# Patient Record
Sex: FEMALE | Race: WHITE | NOT HISPANIC OR LATINO | URBAN - METROPOLITAN AREA
[De-identification: names, ages, dates, MRNs, and addresses within clinical notes are randomized per-mention and may not be internally consistent; named-entity substitution may affect disease eponyms.]

---

## 2018-09-25 ENCOUNTER — IMPORTED ENCOUNTER (OUTPATIENT)
Dept: URBAN - METROPOLITAN AREA CLINIC 31 | Facility: CLINIC | Age: 78
End: 2018-09-25

## 2018-09-25 PROBLEM — H43.813: Noted: 2018-09-25

## 2018-09-25 PROBLEM — H26.493: Noted: 2018-09-25

## 2018-09-25 PROBLEM — H04.123: Noted: 2018-09-25

## 2018-09-25 PROBLEM — Z96.1: Noted: 2018-09-25

## 2018-09-25 PROCEDURE — 92014 COMPRE OPH EXAM EST PT 1/>: CPT

## 2018-09-25 NOTE — PATIENT DISCUSSION
1.  Dry Eye OU:  Continue current management with Artificial Tears. Discussed possible punctal plugs in future if still problematic - declined today. Allergy to restasis. 2. Pseudophakia OU - IOLs stable. Monitor. 3. PCO OU: (Posterior Capsule Opacification)  Not visually significant at this time. Monitor for yag capsulotomy necessity. 4. PVD OU:  Patient was cautioned to call our office immediately if they experience a substantial change in their symptoms such as an increase in floaters persistent flashes loss of visual field (may appear as a shadow or a curtain) or decrease in visual acuity as these may indicate a retinal tear or detachment. If this is a new problem patient will need to return for re-examination  as determined by the physicianReturn for an appointment in 1 year for comprehensive exam. with Dr. Mimi Wiley.

## 2018-11-05 ENCOUNTER — IMPORTED ENCOUNTER (OUTPATIENT)
Dept: URBAN - METROPOLITAN AREA CLINIC 31 | Facility: CLINIC | Age: 78
End: 2018-11-05

## 2018-11-05 PROBLEM — Z96.1: Noted: 2018-11-05

## 2018-11-05 PROCEDURE — 99213 OFFICE O/P EST LOW 20 MIN: CPT

## 2018-11-05 NOTE — PATIENT DISCUSSION
1.  Corneal Abrasion OD resolved. Stop Tobramycin given from OD at [facility]. Keep scheduled appt. 2. Pseudophakia OU - IOLs stable. Monitor.

## 2020-05-15 ENCOUNTER — IMPORTED ENCOUNTER (OUTPATIENT)
Dept: URBAN - METROPOLITAN AREA CLINIC 31 | Facility: CLINIC | Age: 80
End: 2020-05-15

## 2020-05-15 PROBLEM — Z96.1: Noted: 2020-05-15

## 2020-05-15 PROBLEM — H26.492: Noted: 2020-05-15

## 2020-05-15 PROBLEM — H35.3131: Noted: 2020-05-15

## 2020-05-15 PROBLEM — H16.143: Noted: 2020-05-15

## 2020-05-15 PROCEDURE — 92014 COMPRE OPH EXAM EST PT 1/>: CPT

## 2020-05-15 NOTE — PATIENT DISCUSSION
1.  PCO  OS: (Posterior Capsule Opacification)  Not visually significant at this time. Monitor for yag capsulotomy necessity. 2. Pseudophakia OU - IOLs stable. Monitor for changes in vision. 3. ARMD OU dry - Importance of smoking cessation blood pressure control and healthy diet were emphasized. In accordance with the AREDS study a good multivitamin containing EC and Zinc were recommened to be taken daily. Patient was instructed to self monitor their monocular vision (reading/[employer] Grid) at least weekly. Patient should immediately report any new onset of decreased vision or metamorphopsia. 4. SPK: Recommend artificial tears5. Return for an appointment in 1 year for comprehensive exam. with Dr. Umm Roman.

## 2021-05-17 ENCOUNTER — IMPORTED ENCOUNTER (OUTPATIENT)
Dept: URBAN - METROPOLITAN AREA CLINIC 31 | Facility: CLINIC | Age: 81
End: 2021-05-17

## 2021-05-17 PROBLEM — Z96.1: Noted: 2021-05-17

## 2021-05-17 PROBLEM — H35.373: Noted: 2021-05-17

## 2021-05-17 PROBLEM — H04.123: Noted: 2021-05-17

## 2021-05-17 PROCEDURE — 92014 COMPRE OPH EXAM EST PT 1/>: CPT

## 2021-05-17 PROCEDURE — 92015 DETERMINE REFRACTIVE STATE: CPT

## 2021-05-17 PROCEDURE — 92250 FUNDUS PHOTOGRAPHY W/I&R: CPT

## 2021-05-17 NOTE — PATIENT DISCUSSION
1.  Pseudophakia OU - IOLs stable. Monitor for changes in vision. 2. Epiretinal Membrane OU - mild monitor. 3. Dry Eye OU:  Continue current management with Artificial Tears. 4.  Refractive error - change glasses. 5.  Return for an appointment in 1 year for comprehensive exam and OCT Macula with Dr. France Moraes.

## 2022-04-01 ASSESSMENT — VISUAL ACUITY
OS_SC: 20/30
OU_CC: J1+-2
OD_SC: 20/25+2
OD_SC: 20/30+2
OU_SC: 20/25-2
OD_CC: 20/25+2
OD_SC: 20/20
OS_SC: 20/30+2
OS_CC: 20/30-2
OS_CC: 20/30+2
OD_CC: 20/40
OD_PH: CC 20/25 -1
OS_GLARE: 20/40MED
OS_SC: 20/25
OD_GLARE: 20/40MED

## 2022-04-01 ASSESSMENT — TONOMETRY
OD_IOP_MMHG: 13
OS_IOP_MMHG: 15
OD_IOP_MMHG: 14
OS_IOP_MMHG: 16

## 2022-07-09 ENCOUNTER — TELEPHONE ENCOUNTER (OUTPATIENT)
Dept: URBAN - METROPOLITAN AREA CLINIC 121 | Facility: CLINIC | Age: 82
End: 2022-07-09

## 2022-07-10 ENCOUNTER — TELEPHONE ENCOUNTER (OUTPATIENT)
Dept: URBAN - METROPOLITAN AREA CLINIC 121 | Facility: CLINIC | Age: 82
End: 2022-07-10

## 2022-07-10 RX ORDER — CHROMIUM 200 MCG
TABLET ORAL
Refills: 0 | Status: ACTIVE | COMMUNITY
Start: 2011-02-15

## 2022-07-10 RX ORDER — MECLIZINE HYDROCHLORIDE 12.5 MG/1
TABLET ORAL
Refills: 0 | Status: ACTIVE | COMMUNITY
Start: 2011-02-15

## 2022-07-10 RX ORDER — MULTIVIT-MIN/IRON FUM/FOLIC AC 7.5 MG-4
TABLET ORAL
Refills: 0 | Status: ACTIVE | COMMUNITY
Start: 2011-02-15

## 2022-07-10 RX ORDER — PROPRANOLOL HYDROCHLORIDE 80 MG/1
TABLET ORAL
Refills: 0 | Status: ACTIVE | COMMUNITY
Start: 2011-02-15

## 2022-07-10 RX ORDER — OMEGA-3/DHA/EPA/FISH OIL 300-1000MG
CAPSULE ORAL
Refills: 0 | Status: ACTIVE | COMMUNITY
Start: 2011-02-15

## 2022-07-10 RX ORDER — CALCIUM NO.38/D3/MAG/BORON ASP 500MG/15ML
LIQUID (ML) ORAL
Refills: 0 | Status: ACTIVE | COMMUNITY
Start: 2011-02-15

## 2022-07-30 ENCOUNTER — TELEPHONE ENCOUNTER (OUTPATIENT)
Age: 82
End: 2022-07-30

## 2022-07-31 ENCOUNTER — TELEPHONE ENCOUNTER (OUTPATIENT)
Age: 82
End: 2022-07-31

## 2023-08-30 ENCOUNTER — COMPREHENSIVE EXAM (OUTPATIENT)
Dept: URBAN - METROPOLITAN AREA CLINIC 29 | Facility: CLINIC | Age: 83
End: 2023-08-30

## 2023-08-30 DIAGNOSIS — H35.3131: ICD-10-CM

## 2023-08-30 DIAGNOSIS — Z96.1: ICD-10-CM

## 2023-08-30 DIAGNOSIS — H35.373: ICD-10-CM

## 2023-08-30 DIAGNOSIS — H04.123: ICD-10-CM

## 2023-08-30 PROCEDURE — 92015 DETERMINE REFRACTIVE STATE: CPT

## 2023-08-30 PROCEDURE — 92014 COMPRE OPH EXAM EST PT 1/>: CPT

## 2023-08-30 ASSESSMENT — TONOMETRY
OD_IOP_MMHG: 13
OS_IOP_MMHG: 14

## 2023-08-30 ASSESSMENT — VISUAL ACUITY
OS_BAT: 20/50
OD_BAT: 20/40
OS_CC: 20/30-1
OD_CC: 20/30+2

## 2024-03-08 ENCOUNTER — ESTABLISHED PATIENT (OUTPATIENT)
Dept: URBAN - METROPOLITAN AREA CLINIC 29 | Facility: CLINIC | Age: 84
End: 2024-03-08

## 2024-03-08 DIAGNOSIS — H35.373: ICD-10-CM

## 2024-03-08 DIAGNOSIS — Z96.1: ICD-10-CM

## 2024-03-08 DIAGNOSIS — H04.123: ICD-10-CM

## 2024-03-08 DIAGNOSIS — H35.3131: ICD-10-CM

## 2024-03-08 DIAGNOSIS — H43.813: ICD-10-CM

## 2024-03-08 PROCEDURE — 92134 CPTRZ OPH DX IMG PST SGM RTA: CPT

## 2024-03-08 PROCEDURE — 99214 OFFICE O/P EST MOD 30 MIN: CPT

## 2024-03-08 ASSESSMENT — VISUAL ACUITY
OD_CC: 20/30
OS_CC: 20/30-1
OD_CC: 20/25-1
OS_CC: 20/25-2

## 2024-03-08 ASSESSMENT — TONOMETRY
OS_IOP_MMHG: 12
OD_IOP_MMHG: 12

## 2024-08-13 ENCOUNTER — COMPREHENSIVE EXAM (OUTPATIENT)
Dept: URBAN - METROPOLITAN AREA CLINIC 29 | Facility: CLINIC | Age: 84
End: 2024-08-13

## 2024-08-13 DIAGNOSIS — H35.3131: ICD-10-CM

## 2024-08-13 DIAGNOSIS — H35.373: ICD-10-CM

## 2024-08-13 DIAGNOSIS — Z96.1: ICD-10-CM

## 2024-08-13 DIAGNOSIS — H04.123: ICD-10-CM

## 2024-08-13 PROCEDURE — 92014 COMPRE OPH EXAM EST PT 1/>: CPT

## 2024-08-13 PROCEDURE — 92134 CPTRZ OPH DX IMG PST SGM RTA: CPT

## 2024-08-13 ASSESSMENT — TONOMETRY
OD_IOP_MMHG: 16
OS_IOP_MMHG: 17

## 2024-08-13 ASSESSMENT — VISUAL ACUITY
OS_CC: 20/25-2
OD_CC: 20/20-2
OS_CC: 20/25-1
OD_CC: 20/30-1

## 2025-08-14 ENCOUNTER — COMPREHENSIVE EXAM (OUTPATIENT)
Age: 85
End: 2025-08-14

## 2025-08-14 DIAGNOSIS — H35.373: ICD-10-CM

## 2025-08-14 DIAGNOSIS — H35.3131: ICD-10-CM

## 2025-08-14 DIAGNOSIS — Z96.1: ICD-10-CM

## 2025-08-14 DIAGNOSIS — H04.123: ICD-10-CM

## 2025-08-14 PROCEDURE — 99214 OFFICE O/P EST MOD 30 MIN: CPT
